# Patient Record
Sex: MALE | Race: OTHER | HISPANIC OR LATINO | Employment: FULL TIME | URBAN - METROPOLITAN AREA
[De-identification: names, ages, dates, MRNs, and addresses within clinical notes are randomized per-mention and may not be internally consistent; named-entity substitution may affect disease eponyms.]

---

## 2018-02-16 ENCOUNTER — APPOINTMENT (EMERGENCY)
Dept: CT IMAGING | Facility: HOSPITAL | Age: 38
DRG: 419 | End: 2018-02-16
Payer: COMMERCIAL

## 2018-02-16 ENCOUNTER — APPOINTMENT (EMERGENCY)
Dept: ULTRASOUND IMAGING | Facility: HOSPITAL | Age: 38
DRG: 419 | End: 2018-02-16
Payer: COMMERCIAL

## 2018-02-16 ENCOUNTER — HOSPITAL ENCOUNTER (INPATIENT)
Facility: HOSPITAL | Age: 38
LOS: 1 days | Discharge: HOME | DRG: 419 | End: 2018-02-18
Attending: EMERGENCY MEDICINE | Admitting: SURGERY
Payer: COMMERCIAL

## 2018-02-16 DIAGNOSIS — K81.0 ACUTE CHOLECYSTITIS: Primary | ICD-10-CM

## 2018-02-16 DIAGNOSIS — K81.9 CHOLECYSTITIS: ICD-10-CM

## 2018-02-16 LAB
ALBUMIN SERPL BCP-MCNC: 3.9 G/DL (ref 3.5–5)
ALP SERPL-CCNC: 67 U/L (ref 46–116)
ALT SERPL W P-5'-P-CCNC: 52 U/L (ref 12–78)
ANION GAP SERPL CALCULATED.3IONS-SCNC: 8 MMOL/L (ref 4–13)
AST SERPL W P-5'-P-CCNC: 24 U/L (ref 5–45)
BASOPHILS # BLD AUTO: 0.02 THOUSANDS/ΜL (ref 0–0.1)
BASOPHILS NFR BLD AUTO: 0 % (ref 0–1)
BILIRUB SERPL-MCNC: 0.3 MG/DL (ref 0.2–1)
BUN SERPL-MCNC: 17 MG/DL (ref 5–25)
CALCIUM SERPL-MCNC: 8.9 MG/DL (ref 8.3–10.1)
CHLORIDE SERPL-SCNC: 106 MMOL/L (ref 100–108)
CO2 SERPL-SCNC: 27 MMOL/L (ref 21–32)
CREAT SERPL-MCNC: 0.82 MG/DL (ref 0.6–1.3)
EOSINOPHIL # BLD AUTO: 0.04 THOUSAND/ΜL (ref 0–0.61)
EOSINOPHIL NFR BLD AUTO: 1 % (ref 0–6)
ERYTHROCYTE [DISTWIDTH] IN BLOOD BY AUTOMATED COUNT: 12 % (ref 11.6–15.1)
GFR SERPL CREATININE-BSD FRML MDRD: 113 ML/MIN/1.73SQ M
GLUCOSE SERPL-MCNC: 102 MG/DL (ref 65–140)
HCT VFR BLD AUTO: 40 % (ref 36.5–49.3)
HGB BLD-MCNC: 13.9 G/DL (ref 12–17)
LACTATE SERPL-SCNC: 0.4 MMOL/L (ref 0.5–2)
LIPASE SERPL-CCNC: 71 U/L (ref 73–393)
LYMPHOCYTES # BLD AUTO: 1.93 THOUSANDS/ΜL (ref 0.6–4.47)
LYMPHOCYTES NFR BLD AUTO: 22 % (ref 14–44)
MCH RBC QN AUTO: 31 PG (ref 26.8–34.3)
MCHC RBC AUTO-ENTMCNC: 34.8 G/DL (ref 31.4–37.4)
MCV RBC AUTO: 89 FL (ref 82–98)
MONOCYTES # BLD AUTO: 0.73 THOUSAND/ΜL (ref 0.17–1.22)
MONOCYTES NFR BLD AUTO: 8 % (ref 4–12)
NEUTROPHILS # BLD AUTO: 6.14 THOUSANDS/ΜL (ref 1.85–7.62)
NEUTS SEG NFR BLD AUTO: 69 % (ref 43–75)
PLATELET # BLD AUTO: 270 THOUSANDS/UL (ref 149–390)
PMV BLD AUTO: 9.4 FL (ref 8.9–12.7)
POTASSIUM SERPL-SCNC: 3.9 MMOL/L (ref 3.5–5.3)
PROT SERPL-MCNC: 7.1 G/DL (ref 6.4–8.2)
RBC # BLD AUTO: 4.49 MILLION/UL (ref 3.88–5.62)
SODIUM SERPL-SCNC: 141 MMOL/L (ref 136–145)
WBC # BLD AUTO: 8.86 THOUSAND/UL (ref 4.31–10.16)

## 2018-02-16 PROCEDURE — 99285 EMERGENCY DEPT VISIT HI MDM: CPT

## 2018-02-16 PROCEDURE — 96361 HYDRATE IV INFUSION ADD-ON: CPT

## 2018-02-16 PROCEDURE — 80053 COMPREHEN METABOLIC PANEL: CPT | Performed by: EMERGENCY MEDICINE

## 2018-02-16 PROCEDURE — 85025 COMPLETE CBC W/AUTO DIFF WBC: CPT | Performed by: EMERGENCY MEDICINE

## 2018-02-16 PROCEDURE — 96375 TX/PRO/DX INJ NEW DRUG ADDON: CPT

## 2018-02-16 PROCEDURE — 76705 ECHO EXAM OF ABDOMEN: CPT

## 2018-02-16 PROCEDURE — 96365 THER/PROPH/DIAG IV INF INIT: CPT

## 2018-02-16 PROCEDURE — 99220 PR INITIAL OBSERVATION CARE/DAY 70 MINUTES: CPT | Performed by: PHYSICIAN ASSISTANT

## 2018-02-16 PROCEDURE — 83690 ASSAY OF LIPASE: CPT | Performed by: EMERGENCY MEDICINE

## 2018-02-16 PROCEDURE — 83605 ASSAY OF LACTIC ACID: CPT | Performed by: EMERGENCY MEDICINE

## 2018-02-16 PROCEDURE — 36415 COLL VENOUS BLD VENIPUNCTURE: CPT | Performed by: EMERGENCY MEDICINE

## 2018-02-16 PROCEDURE — 96367 TX/PROPH/DG ADDL SEQ IV INF: CPT

## 2018-02-16 PROCEDURE — 74177 CT ABD & PELVIS W/CONTRAST: CPT

## 2018-02-16 RX ORDER — SODIUM CHLORIDE, SODIUM LACTATE, POTASSIUM CHLORIDE, CALCIUM CHLORIDE 600; 310; 30; 20 MG/100ML; MG/100ML; MG/100ML; MG/100ML
84 INJECTION, SOLUTION INTRAVENOUS CONTINUOUS
Status: DISCONTINUED | OUTPATIENT
Start: 2018-02-16 | End: 2018-02-18 | Stop reason: HOSPADM

## 2018-02-16 RX ORDER — ONDANSETRON 2 MG/ML
4 INJECTION INTRAMUSCULAR; INTRAVENOUS EVERY 6 HOURS PRN
Status: DISCONTINUED | OUTPATIENT
Start: 2018-02-16 | End: 2018-02-18 | Stop reason: HOSPADM

## 2018-02-16 RX ORDER — KETOROLAC TROMETHAMINE 30 MG/ML
15 INJECTION, SOLUTION INTRAMUSCULAR; INTRAVENOUS ONCE
Status: COMPLETED | OUTPATIENT
Start: 2018-02-16 | End: 2018-02-16

## 2018-02-16 RX ORDER — ONDANSETRON 2 MG/ML
4 INJECTION INTRAMUSCULAR; INTRAVENOUS ONCE
Status: COMPLETED | OUTPATIENT
Start: 2018-02-16 | End: 2018-02-16

## 2018-02-16 RX ADMIN — METRONIDAZOLE 500 MG: 500 INJECTION, SOLUTION INTRAVENOUS at 21:59

## 2018-02-16 RX ADMIN — IOHEXOL 100 ML: 350 INJECTION, SOLUTION INTRAVENOUS at 20:04

## 2018-02-16 RX ADMIN — SODIUM CHLORIDE 1000 ML: 0.9 INJECTION, SOLUTION INTRAVENOUS at 19:06

## 2018-02-16 RX ADMIN — CEFAZOLIN SODIUM 1000 MG: 1 SOLUTION INTRAVENOUS at 21:00

## 2018-02-16 RX ADMIN — ONDANSETRON 4 MG: 2 INJECTION INTRAMUSCULAR; INTRAVENOUS at 19:10

## 2018-02-16 RX ADMIN — KETOROLAC TROMETHAMINE 15 MG: 30 INJECTION, SOLUTION INTRAMUSCULAR at 19:11

## 2018-02-16 NOTE — ED PROVIDER NOTES
History  Chief Complaint   Patient presents with    Abdominal Pain     pt reports having nausea and abdominal pain x 3 days  no fevers  no OTc meds      70-year-old male presents to the emergency department for evaluation of abdominal pain with nausea and diarrhea  Patient's symptoms started 3 days ago  At that time he had generalized abdominal cramping  The pain was coming go in waves  At times the pain radiates into his back  Yesterday he had multiple episodes of loose watery nonbloody stool  He had nausea and dry heaving  He denies fever  Patient's significant other at the bedside did have some abdominal discomfort and chills a few days ago, this was 1 day before the patient developed symptoms  No travel  No suspicious food intake  No blood in the stool or vomit  Patient reports severely impaired appetite  No dysuria or hematuria  History provided by:  Patient   used: Yes    Abdominal Pain   Pain location:  Generalized  Pain quality: cramping and sharp    Pain radiates to:  Back  Pain severity:  Moderate  Onset quality:  Gradual  Duration:  3 days  Timing:  Intermittent  Progression:  Unchanged  Chronicity:  New  Context: sick contacts    Context: not alcohol use, not previous surgeries, not recent travel and not suspicious food intake    Relieved by:  Nothing  Worsened by:  Nothing  Ineffective treatments:  None tried  Associated symptoms: anorexia, diarrhea and nausea    Associated symptoms: no constipation, no dysuria, no fever, no hematuria and no sore throat    Risk factors: no recent hospitalization        None       History reviewed  No pertinent past medical history  Past Surgical History:   Procedure Laterality Date    CHOLECYSTECTOMY LAPAROSCOPIC N/A 2/17/2018    Procedure: CHOLECYSTECTOMY LAPAROSCOPIC;  Surgeon: Daniel Angel MD;  Location: [facility];  Service: General       History reviewed  No pertinent family history    I have reviewed and agree with the history as documented  Social History   Substance Use Topics    Smoking status: Never Smoker    Smokeless tobacco: Never Used    Alcohol use Not on file      Comment: 1 drink per week        Review of Systems   Constitutional: Negative for fever  HENT: Negative for sore throat  Gastrointestinal: Positive for abdominal pain, anorexia, diarrhea and nausea  Negative for constipation  Genitourinary: Negative for dysuria and hematuria  All other systems reviewed and are negative  Physical Exam  ED Triage Vitals [02/16/18 1617]   Temperature Pulse Respirations Blood Pressure SpO2   99 4 °F (37 4 °C) 72 16 144/72 98 %      Temp Source Heart Rate Source Patient Position - Orthostatic VS BP Location FiO2 (%)   Oral Monitor Sitting Left arm --      Pain Score       8           Orthostatic Vital Signs  Vitals:    02/17/18 1500 02/17/18 1615 02/17/18 2335 02/18/18 0836   BP: 143/83 140/80 118/59 115/57   Pulse: 79 80 71 63   Patient Position - Orthostatic VS: Standing Sitting Lying Lying       Physical Exam   Constitutional: He is oriented to person, place, and time  Vital signs are normal  He appears well-developed and well-nourished  HENT:   Head: Normocephalic and atraumatic  Mouth/Throat: Mucous membranes are dry  Eyes: Conjunctivae and EOM are normal  Pupils are equal, round, and reactive to light  Neck: Normal range of motion  Neck supple  Cardiovascular: Normal rate, regular rhythm, normal heart sounds and intact distal pulses  Pulmonary/Chest: Effort normal and breath sounds normal  No accessory muscle usage  No respiratory distress  He exhibits no tenderness  Abdominal: Soft  Normal appearance and bowel sounds are normal  He exhibits no distension  There is tenderness in the right upper quadrant, periumbilical area and left upper quadrant  There is no rebound and no guarding  No hernia  Musculoskeletal: Normal range of motion  He exhibits no edema, tenderness or deformity  Lymphadenopathy:     He has no cervical adenopathy  Neurological: He is alert and oriented to person, place, and time  He has normal strength and normal reflexes  Coordination normal    Skin: Skin is warm, dry and intact  No rash noted  Psychiatric: He has a normal mood and affect  His behavior is normal  Judgment and thought content normal    Vitals reviewed  ED Medications  Medications   sodium chloride 0 9 % bolus 1,000 mL (0 mL Intravenous Stopped 2/16/18 2005)   ondansetron (ZOFRAN) injection 4 mg (4 mg Intravenous Given 2/16/18 1910)   ketorolac (TORADOL) injection 15 mg (15 mg Intravenous Given 2/16/18 1911)   iohexol (OMNIPAQUE) 350 MG/ML injection (MULTI-DOSE) 100 mL (100 mL Intravenous Given 2/16/18 2004)   metroNIDAZOLE (FLAGYL) IVPB (premix) 500 mg (0 mg Intravenous Stopped 2/16/18 2244)   ceFAZolin (ANCEF) IVPB (premix) 1,000 mg (0 mg Intravenous Stopped 2/16/18 2139)       Diagnostic Studies  Results Reviewed     Procedure Component Value Units Date/Time    Lactic acid, plasma [[id number]]  (Abnormal) Collected:  02/16/18 1906    Lab Status:  Final result Specimen:  Blood from Arm, Right Updated:  02/16/18 1940     LACTIC ACID 0 4 (L) mmol/L     Narrative:         Result may be elevated if tourniquet was used during collection      Comprehensive metabolic panel [[id number]] Collected:  02/16/18 1906    Lab Status:  Final result Specimen:  Blood from Arm, Right Updated:  02/16/18 1938     Sodium 141 mmol/L      Potassium 3 9 mmol/L      Chloride 106 mmol/L      CO2 27 mmol/L      Anion Gap 8 mmol/L      BUN 17 mg/dL      Creatinine 0 82 mg/dL      Glucose 102 mg/dL      Calcium 8 9 mg/dL      AST 24 U/L      ALT 52 U/L      Alkaline Phosphatase 67 U/L      Total Protein 7 1 g/dL      Albumin 3 9 g/dL      Total Bilirubin 0 30 mg/dL      eGFR 113 ml/min/1 73sq m     Narrative:         National Kidney Disease Education Program recommendations are as follows:  GFR calculation is accurate only with a steady state creatinine  Chronic Kidney disease less than 60 ml/min/1 73 sq  meters  Kidney failure less than 15 ml/min/1 73 sq  meters  Lipase [[id number]]  (Abnormal) Collected:  02/16/18 1906    Lab Status:  Final result Specimen:  Blood from Arm, Right Updated:  02/16/18 1938     Lipase 71 (L) u/L     CBC and differential [[id number]]  (Normal) Collected:  02/16/18 1906    Lab Status:  Final result Specimen:  Blood from Arm, Right Updated:  02/16/18 1916     WBC 8 86 Thousand/uL      RBC 4 49 Million/uL      Hemoglobin 13 9 g/dL      Hematocrit 40 0 %      MCV 89 fL      MCH 31 0 pg      MCHC 34 8 g/dL      RDW 12 0 %      MPV 9 4 fL      Platelets 489 Thousands/uL      Neutrophils Relative 69 %      Lymphocytes Relative 22 %      Monocytes Relative 8 %      Eosinophils Relative 1 %      Basophils Relative 0 %      Neutrophils Absolute 6 14 Thousands/µL      Lymphocytes Absolute 1 93 Thousands/µL      Monocytes Absolute 0 73 Thousand/µL      Eosinophils Absolute 0 04 Thousand/µL      Basophils Absolute 0 02 Thousands/µL                  US gallbladder   Final Result by Tomeka Christopher MD (02/16 2215)      Cholelithiasis with gallbladder sludge and gallbladder wall thickening with trace amount of pericholecystic fluid concerning for acute cholecystitis  Workstation performed: [id number]         CT abdomen pelvis with contrast   Final Result by Tomeka Christopher MD (02/16 2033)      Suspect acute cholecystitis  Correlate with right upper quadrant ultrasound              Workstation performed: [id number]                    Procedures  Procedures       Phone Contacts  ED Phone Contact    ED Course  ED Course as of Feb 19 1015   Fri Feb 16, 2018   2040 Case discussed with on-call surgical resident who will follow up with the patient after ultrasound is performed                                MDM  Number of Diagnoses or Management Options  Acute cholecystitis: new and requires workup     Amount and/or Complexity of Data Reviewed  Clinical lab tests: ordered and reviewed  Tests in the radiology section of CPT®: ordered and reviewed  Decide to obtain previous medical records or to obtain history from someone other than the patient: yes  Obtain history from someone other than the patient: yes  Discuss the patient with other providers: yes  Independent visualization of images, tracings, or specimens: yes    Patient Progress  Patient progress: stable    CritCare Time    Disposition  Final diagnoses:   Acute cholecystitis     Time reflects when diagnosis was documented in both MDM as applicable and the Disposition within this note     Time User Action Codes Description Comment    2/16/2018  8:39 PM Marilin Hickey Add [K81 0] Acute cholecystitis     2/17/2018  9:24 AM Ami Falcon Modify [K81 0] Acute cholecystitis     2/17/2018 10:28 AM Janette Henry Modify [K81 0] Acute cholecystitis     2/18/2018  8:02 AM Janette Henry Add [K81 9] Cholecystitis       ED Disposition     ED Disposition Condition Comment    Admit  Case was discussed with Surg Resident Janette Stapleton and the patient's admission status was agreed to be Admission Status: observation status to the service of Dr Fela Barnard           Follow-up Information     Follow up With Specialties Details Why Cesar Crandall MD General Surgery Follow up in 2 week(s)  [address]          Discharge Medication List as of 2/18/2018  9:09 AM      START taking these medications    Details   oxyCODONE-acetaminophen (PERCOCET) 5-325 mg per tablet Take 1 tablet by mouth every 4 (four) hours as needed for moderate pain for up to 10 days Max Daily Amount: 6 tablets, Starting Sun 2/18/2018, Until Wed 2/28/2018, Print             Outpatient Discharge Orders  Discharge patient         ED Provider  Electronically Signed by           Wan Ayala DO  02/19/18 7884

## 2018-02-17 ENCOUNTER — ANESTHESIA EVENT (OUTPATIENT)
Dept: PERIOP | Facility: HOSPITAL | Age: 38
DRG: 419 | End: 2018-02-17
Payer: COMMERCIAL

## 2018-02-17 ENCOUNTER — ANESTHESIA (OUTPATIENT)
Dept: PERIOP | Facility: HOSPITAL | Age: 38
DRG: 419 | End: 2018-02-17
Payer: COMMERCIAL

## 2018-02-17 PROBLEM — K81.0 ACUTE CHOLECYSTITIS: Status: ACTIVE | Noted: 2018-02-16

## 2018-02-17 PROCEDURE — 0FT44ZZ RESECTION OF GALLBLADDER, PERCUTANEOUS ENDOSCOPIC APPROACH: ICD-10-PCS | Performed by: SURGERY

## 2018-02-17 PROCEDURE — 88304 TISSUE EXAM BY PATHOLOGIST: CPT | Performed by: SURGERY

## 2018-02-17 PROCEDURE — 88304 TISSUE EXAM BY PATHOLOGIST: CPT | Performed by: PATHOLOGY

## 2018-02-17 RX ORDER — ROCURONIUM BROMIDE 10 MG/ML
INJECTION, SOLUTION INTRAVENOUS AS NEEDED
Status: DISCONTINUED | OUTPATIENT
Start: 2018-02-17 | End: 2018-02-17 | Stop reason: SURG

## 2018-02-17 RX ORDER — MIDAZOLAM HYDROCHLORIDE 1 MG/ML
INJECTION INTRAMUSCULAR; INTRAVENOUS AS NEEDED
Status: DISCONTINUED | OUTPATIENT
Start: 2018-02-17 | End: 2018-02-17 | Stop reason: SURG

## 2018-02-17 RX ORDER — SODIUM CHLORIDE 9 MG/ML
INJECTION, SOLUTION INTRAVENOUS AS NEEDED
Status: DISCONTINUED | OUTPATIENT
Start: 2018-02-17 | End: 2018-02-17 | Stop reason: HOSPADM

## 2018-02-17 RX ORDER — CEFAZOLIN SODIUM 1 G/3ML
INJECTION, POWDER, FOR SOLUTION INTRAMUSCULAR; INTRAVENOUS AS NEEDED
Status: DISCONTINUED | OUTPATIENT
Start: 2018-02-17 | End: 2018-02-17 | Stop reason: SURG

## 2018-02-17 RX ORDER — FENTANYL CITRATE 50 UG/ML
INJECTION, SOLUTION INTRAMUSCULAR; INTRAVENOUS AS NEEDED
Status: DISCONTINUED | OUTPATIENT
Start: 2018-02-17 | End: 2018-02-17 | Stop reason: SURG

## 2018-02-17 RX ORDER — PROPOFOL 10 MG/ML
INJECTION, EMULSION INTRAVENOUS AS NEEDED
Status: DISCONTINUED | OUTPATIENT
Start: 2018-02-17 | End: 2018-02-17 | Stop reason: SURG

## 2018-02-17 RX ORDER — KETOROLAC TROMETHAMINE 30 MG/ML
INJECTION, SOLUTION INTRAMUSCULAR; INTRAVENOUS AS NEEDED
Status: DISCONTINUED | OUTPATIENT
Start: 2018-02-17 | End: 2018-02-17 | Stop reason: SURG

## 2018-02-17 RX ORDER — OXYCODONE HYDROCHLORIDE AND ACETAMINOPHEN 5; 325 MG/1; MG/1
1 TABLET ORAL EVERY 4 HOURS PRN
Status: DISCONTINUED | OUTPATIENT
Start: 2018-02-17 | End: 2018-02-18 | Stop reason: HOSPADM

## 2018-02-17 RX ORDER — METOCLOPRAMIDE HYDROCHLORIDE 5 MG/ML
10 INJECTION INTRAMUSCULAR; INTRAVENOUS ONCE AS NEEDED
Status: DISCONTINUED | OUTPATIENT
Start: 2018-02-17 | End: 2018-02-17 | Stop reason: HOSPADM

## 2018-02-17 RX ORDER — GLYCOPYRROLATE 0.2 MG/ML
INJECTION INTRAMUSCULAR; INTRAVENOUS AS NEEDED
Status: DISCONTINUED | OUTPATIENT
Start: 2018-02-17 | End: 2018-02-17 | Stop reason: SURG

## 2018-02-17 RX ORDER — PROMETHAZINE HYDROCHLORIDE 25 MG/ML
12.5 INJECTION, SOLUTION INTRAMUSCULAR; INTRAVENOUS ONCE AS NEEDED
Status: DISCONTINUED | OUTPATIENT
Start: 2018-02-17 | End: 2018-02-17 | Stop reason: HOSPADM

## 2018-02-17 RX ORDER — OXYCODONE HYDROCHLORIDE AND ACETAMINOPHEN 5; 325 MG/1; MG/1
2 TABLET ORAL EVERY 4 HOURS PRN
Status: DISCONTINUED | OUTPATIENT
Start: 2018-02-17 | End: 2018-02-18 | Stop reason: HOSPADM

## 2018-02-17 RX ORDER — HYDROMORPHONE HYDROCHLORIDE 2 MG/ML
INJECTION, SOLUTION INTRAMUSCULAR; INTRAVENOUS; SUBCUTANEOUS AS NEEDED
Status: DISCONTINUED | OUTPATIENT
Start: 2018-02-17 | End: 2018-02-17 | Stop reason: SURG

## 2018-02-17 RX ORDER — MAGNESIUM HYDROXIDE 1200 MG/15ML
LIQUID ORAL AS NEEDED
Status: DISCONTINUED | OUTPATIENT
Start: 2018-02-17 | End: 2018-02-17 | Stop reason: HOSPADM

## 2018-02-17 RX ORDER — ONDANSETRON 2 MG/ML
INJECTION INTRAMUSCULAR; INTRAVENOUS AS NEEDED
Status: DISCONTINUED | OUTPATIENT
Start: 2018-02-17 | End: 2018-02-17 | Stop reason: SURG

## 2018-02-17 RX ORDER — ONDANSETRON 2 MG/ML
4 INJECTION INTRAMUSCULAR; INTRAVENOUS ONCE AS NEEDED
Status: DISCONTINUED | OUTPATIENT
Start: 2018-02-17 | End: 2018-02-17 | Stop reason: HOSPADM

## 2018-02-17 RX ORDER — FENTANYL CITRATE/PF 50 MCG/ML
25 SYRINGE (ML) INJECTION
Status: DISCONTINUED | OUTPATIENT
Start: 2018-02-17 | End: 2018-02-17 | Stop reason: HOSPADM

## 2018-02-17 RX ORDER — BUPIVACAINE HYDROCHLORIDE AND EPINEPHRINE 2.5; 5 MG/ML; UG/ML
INJECTION, SOLUTION EPIDURAL; INFILTRATION; INTRACAUDAL; PERINEURAL AS NEEDED
Status: DISCONTINUED | OUTPATIENT
Start: 2018-02-17 | End: 2018-02-17 | Stop reason: HOSPADM

## 2018-02-17 RX ORDER — SODIUM CHLORIDE, SODIUM LACTATE, POTASSIUM CHLORIDE, CALCIUM CHLORIDE 600; 310; 30; 20 MG/100ML; MG/100ML; MG/100ML; MG/100ML
100 INJECTION, SOLUTION INTRAVENOUS CONTINUOUS
Status: DISCONTINUED | OUTPATIENT
Start: 2018-02-17 | End: 2018-02-18 | Stop reason: HOSPADM

## 2018-02-17 RX ADMIN — SODIUM CHLORIDE, SODIUM LACTATE, POTASSIUM CHLORIDE, AND CALCIUM CHLORIDE 100 ML/HR: .6; .31; .03; .02 INJECTION, SOLUTION INTRAVENOUS at 22:20

## 2018-02-17 RX ADMIN — METRONIDAZOLE 500 MG: 500 INJECTION, SOLUTION INTRAVENOUS at 15:38

## 2018-02-17 RX ADMIN — METRONIDAZOLE 500 MG: 500 INJECTION, SOLUTION INTRAVENOUS at 22:20

## 2018-02-17 RX ADMIN — LIDOCAINE HYDROCHLORIDE 100 MG: 20 INJECTION, SOLUTION INTRAVENOUS at 08:59

## 2018-02-17 RX ADMIN — DEXAMETHASONE SODIUM PHOSPHATE 10 MG: 10 INJECTION INTRAMUSCULAR; INTRAVENOUS at 09:10

## 2018-02-17 RX ADMIN — CEFAZOLIN SODIUM 2000 MG: 1 INJECTION, POWDER, FOR SOLUTION INTRAMUSCULAR; INTRAVENOUS at 09:05

## 2018-02-17 RX ADMIN — OXYCODONE HYDROCHLORIDE AND ACETAMINOPHEN 1 TABLET: 5; 325 TABLET ORAL at 11:17

## 2018-02-17 RX ADMIN — HYDROMORPHONE HYDROCHLORIDE 0.5 MG: 2 INJECTION, SOLUTION INTRAMUSCULAR; INTRAVENOUS; SUBCUTANEOUS at 10:06

## 2018-02-17 RX ADMIN — ROCURONIUM BROMIDE 10 MG: 10 INJECTION INTRAVENOUS at 10:05

## 2018-02-17 RX ADMIN — KETOROLAC TROMETHAMINE 15 MG: 30 INJECTION, SOLUTION INTRAMUSCULAR at 10:09

## 2018-02-17 RX ADMIN — ROCURONIUM BROMIDE 30 MG: 10 INJECTION INTRAVENOUS at 08:59

## 2018-02-17 RX ADMIN — PROPOFOL 200 MG: 10 INJECTION, EMULSION INTRAVENOUS at 08:59

## 2018-02-17 RX ADMIN — METRONIDAZOLE 500 MG: 500 INJECTION, SOLUTION INTRAVENOUS at 06:09

## 2018-02-17 RX ADMIN — NEOSTIGMINE METHYLSULFATE 3 MG: 1 INJECTION, SOLUTION INTRAMUSCULAR; INTRAVENOUS; SUBCUTANEOUS at 10:18

## 2018-02-17 RX ADMIN — GLYCOPYRROLATE 0.6 MG: 0.2 INJECTION, SOLUTION INTRAMUSCULAR; INTRAVENOUS at 10:18

## 2018-02-17 RX ADMIN — ONDANSETRON 4 MG: 2 INJECTION INTRAMUSCULAR; INTRAVENOUS at 09:10

## 2018-02-17 RX ADMIN — FENTANYL CITRATE 50 MCG: 50 INJECTION INTRAMUSCULAR; INTRAVENOUS at 08:59

## 2018-02-17 RX ADMIN — CEFAZOLIN SODIUM 1000 MG: 1 SOLUTION INTRAVENOUS at 04:56

## 2018-02-17 RX ADMIN — MIDAZOLAM HYDROCHLORIDE 2 MG: 1 INJECTION, SOLUTION INTRAMUSCULAR; INTRAVENOUS at 08:57

## 2018-02-17 RX ADMIN — SODIUM CHLORIDE, SODIUM LACTATE, POTASSIUM CHLORIDE, AND CALCIUM CHLORIDE 125 ML/HR: .6; .31; .03; .02 INJECTION, SOLUTION INTRAVENOUS at 00:21

## 2018-02-17 RX ADMIN — FENTANYL CITRATE 50 MCG: 50 INJECTION INTRAMUSCULAR; INTRAVENOUS at 09:17

## 2018-02-17 NOTE — CASE MANAGEMENT
Thank you,  [address] [facility] in the [facility] by Ty Posey for 2017  Network Utilization Review Department  Phone: [phone]; Fax [phone]  ATTENTION: The Network Utilization Review Department is now centralized for our 7 Facilities  Make a note that we have a new phone and fax numbers for our Department  Please call with any questions or concerns to [phone] and carefully follow the prompts so that you are directed to the right person  All voicemails are confidential  Fax any determinations, approvals, denials, and requests for initial or continue stay review clinical to [phone]  Due to HIGH CALL volume, it would be easier if you could please send faxed requests to expedite your requests and in part, help us provide discharge notifications faster  Initial Clinical Review    Admission: Date/Time/Statement: OBSERVATION 2/16/18 @2243  CONVERTED TO INPATIENT ADMISSION 2/17/18 @1201 DUE TO INPATIENT TREATMENT FOR  ACUTE CHOLECYSTITIS NEEDED  02/17/18 1200  Inpatient Admission Once     Comments: Inpatient treatmant of acute cholecytitis needed   Transfer Service: Surgery-General       Question Answer Comment   Admitting Physician PRADEEP PETERSEN    Level of Care Med Surg    Estimated length of stay More than 2 Midnights    Certification I certify that inpatient services are medically necessary for this patient for a duration of greater than two midnights  See H&P and MD Progress Notes for additional information about the patient's course of treatment          02/17/18 1201     ED: Date/Time/Mode of Arrival:   ED Arrival Information     Expected Arrival Acuity Means of Arrival Escorted By Service Admission Type    - 2/16/2018 15:48 Urgent Walk-In Family Member Surgery-General Urgent    Arrival Complaint    abdominal pain          Chief Complaint:   Chief Complaint   Patient presents with    Abdominal Pain     pt reports having nausea and abdominal pain x 3 days  no fevers  no OTc meds        History of Illness: Maricel Haro is a 40y o  year old male who presents with 10/10 RUQ pain that radiates to the back  He's had the pain for 2-3 days  Initially it was intermittent, but today it became much worse  It is associated with anorexia, diarrhea yesterday and nausea  Denies vomiting  No previous abdominal surgeries  ED Vital Signs:   ED Triage Vitals [02/16/18 1617]   Temperature Pulse Respirations Blood Pressure SpO2   99 4 °F (37 4 °C) 72 16 144/72 98 %      Temp Source Heart Rate Source Patient Position - Orthostatic VS BP Location FiO2 (%)   Oral Monitor Sitting Left arm --      Pain Score       8        Wt Readings from Last 1 Encounters:   02/16/18 91 1 kg (200 lb 13 4 oz)       Abnormal Labs/Diagnostic Test Results:   2/16: lipase 71   Lactic acid   4    2/16 CT a&p: Suspect acute cholecystitis  Correlate with right upper quadrant ultrasound  2/16 US GB: Cholelithiasis with gallbladder sludge and gallbladder wall thickening with trace amount of pericholecystic fluid concerning for acute cholecystitis  ED Treatment:   Medication Administration from 02/16/2018 1548 to 02/16/2018 2311       Date/Time Order Dose Route Action Action by Comments     02/16/2018 1906 sodium chloride 0 9 % bolus 1,000 mL 1,000 mL Intravenous [place] Bennett RN      02/16/2018 1910 ondansetron (ZOFRAN) injection 4 mg 4 mg Intravenous Given Esha Bennett RN      02/16/2018 1911 ketorolac (TORADOL) injection 15 mg 15 mg Intravenous Given Esha Bennett RN      02/16/2018 2159 metroNIDAZOLE (FLAGYL) IVPB (premix) 500 mg 500 mg Intravenous [place] Bennett RN      02/16/2018 2100 ceFAZolin (ANCEF) IVPB (premix) 1,000 mg 1,000 mg Intravenous [place] Bennett RN           Past Medical/Surgical History:    Active Ambulatory Problems     Diagnosis Date Noted    No Active Ambulatory Problems     Resolved Ambulatory Problems     Diagnosis Date Noted    No Resolved Ambulatory Problems     No Additional Past Medical History       Admitting Diagnosis: Acute cholecystitis [K81 0]  Abdominal pain [R10 9]    Age/Sex: 40 y o  male    Assessment/Plan:   Problem List      * (Principal)Cholecystitis with sludge/stones - 39 yo health male with symptoms and radiographic findings consistent with acute cholecystitis  NPO for OR most likely tomorrow    IVF  IV abx  Pain medication               Admission Orders:  Scheduled Meds:   Current Facility-Administered Medications:  [MAR Hold] enoxaparin 40 mg Subcutaneous Daily Tim Giraldo PA-C    fentaNYL 25 mcg Intravenous Q3 min PRN Sesar Locke MD    HYDROmorphone 0 4 mg Intravenous Q5 Min PRN Sesar Locke MD    [facility] Hold] HYDROmorphone 0 5 mg Intravenous Q3H PRN Tim Giraldo PA-C    [facility] Hold] HYDROmorphone 1 mg Intravenous Q3H PRN Tim Giraldo PA-C    lactated ringers 84 mL/hr Intravenous Continuous Jere Solomon MD Last Rate: 125 mL/hr (02/17/18 0021)   metoclopramide 10 mg Intravenous Once PRN Sesar Locke MD    [facility] Hold] metroNIDAZOLE 500 mg Intravenous Q8H Tim Giraldo PA-C Last Rate: 500 mg (02/17/18 0609)   [MAR Hold] ondansetron 4 mg Intravenous Q6H PRN Tim Giraldo PA-C    ondansetron 4 mg Intravenous Once PRN Sesar Locke MD    oxyCODONE-acetaminophen 1 tablet Oral Q4H PRN Jere Solomon MD    oxyCODONE-acetaminophen 2 tablet Oral Q4H PRN Jere Solomon MD    promethazine 12 5 mg Intravenous Once PRN Sesar Locke MD      Continuous Infusions:   lactated ringers 84 mL/hr Last Rate: 125 mL/hr (02/17/18 0021)     PRN Meds: fentaNYL    HYDROmorphone    Metoclopramide    Ondansetron    oxyCODONE-acetaminophen    Promethazine    HSE DIET  Up as darleen  Encourage fluids  SCD's      PER SURGERY 2/17:  Assessment:  40 Y O M with acute cholecystitis      Plan:  NPO  IVF  OR today for lap alejo  preop and consent obtained    TAKEN TO OR 2/17 @1251:LAPAROSCOPIC CHOLECYSTECTOMY

## 2018-02-17 NOTE — ANESTHESIA POSTPROCEDURE EVALUATION
Post-Op Assessment Note      CV Status:  Stable    Mental Status:  Awake    Hydration Status:  Stable    PONV Controlled:  None    Airway Patency:  Patent    Post Op Vitals Reviewed: Yes          Staff: Anesthesiologist           /72 (02/17/18 1033)    Temp 97 7 °F (36 5 °C) (02/17/18 1033)    Pulse 85 (02/17/18 1033)   Resp 17 (02/17/18 1033)    SpO2 97 % (02/17/18 1033)

## 2018-02-17 NOTE — ANESTHESIA PREPROCEDURE EVALUATION
Review of Systems/Medical History      No history of anesthetic complications     Cardiovascular  Negative cardio ROS    Pulmonary  Negative pulmonary ROS   Comment: nonsmoker     GI/Hepatic    GERD well controlled,   Comment: etoh occas     Negative  ROS        Endo/Other  Negative endo/other ROS      GYN       Hematology  Negative hematology ROS      Musculoskeletal  Negative musculoskeletal ROS        Neurology  Negative neurology ROS      Psychology   Anxiety,              Physical Exam    Airway    Mallampati score: II  TM Distance: >3 FB  Neck ROM: full     Dental   No notable dental hx     Cardiovascular  Comment: Negative ROS,     Pulmonary      Other Findings        Anesthesia Plan  ASA Score- 1     Anesthesia Type- general with ASA Monitors  Additional Monitors:   Airway Plan: ETT  Plan Factors-    Induction- intravenous  Postoperative Plan-     Informed Consent- Anesthetic plan and risks discussed with patient

## 2018-02-17 NOTE — PROGRESS NOTES
Progress Note - General Surgery   Benji Zabala 40 y o  male MRN: [id number]  Unit/Bed#: OR POOL Encounter: [id number]    Assessment:  40 Y O M with acute cholecystitis  Plan:  NPO  IVF  OR today for lap alejo  preop and consent obtained      Subjective/Objective       Subjective: No issues overnight, pain subsiding  Blood pressure 116/55, pulse 57, temperature 97 9 °F (36 6 °C), temperature source Oral, resp  rate 18, height 5' 8" (1 727 m), weight 91 1 kg (200 lb 13 4 oz), SpO2 100 %  ,Body mass index is 30 54 kg/m²  Intake/Output Summary (Last 24 hours) at 02/17/18 0847  Last data filed at 02/16/18 2244   Gross per 24 hour   Intake             1100 ml   Output                0 ml   Net             1100 ml       Invasive Devices     Peripheral Intravenous Line            Peripheral IV 02/16/18 Right Antecubital less than 1 day                Physical Exam:   Gen: AOAx3  Lungs: CTA B/L  Abd: soft, tender in RUQ  No peritoneal signs  Lab, Imaging and other studies:  I have personally reviewed pertinent lab results    , CBC:   Lab Results   Component Value Date    WBC 8 86 02/16/2018    HGB 13 9 02/16/2018    HCT 40 0 02/16/2018    MCV 89 02/16/2018     02/16/2018    MCH 31 0 02/16/2018    MCHC 34 8 02/16/2018    RDW 12 0 02/16/2018    MPV 9 4 02/16/2018   , CMP:   Lab Results   Component Value Date     02/16/2018    K 3 9 02/16/2018     02/16/2018    CO2 27 02/16/2018    ANIONGAP 8 02/16/2018    BUN 17 02/16/2018    CREATININE 0 82 02/16/2018    GLUCOSE 102 02/16/2018    CALCIUM 8 9 02/16/2018    AST 24 02/16/2018    ALT 52 02/16/2018    ALKPHOS 67 02/16/2018    PROT 7 1 02/16/2018    BILITOT 0 30 02/16/2018    EGFR 113 02/16/2018   , Coagulation: No results found for: PT, INR, APTT, Urinalysis: No results found for: COLORU, CLARITYU, SPECGRAV, PHUR, LEUKOCYTESUR, NITRITE, PROTEINUA, GLUCOSEU, KETONESU, BILIRUBINUR, BLOODU, Amylase: No results found for: AMYLASE, Lipase:   Lab Results   Component Value Date    LIPASE 71 (L) 02/16/2018     VTE Pharmacologic Prophylaxis: Enoxaparin (Lovenox)  VTE Mechanical Prophylaxis: sequential compression device

## 2018-02-17 NOTE — H&P
History and Physical -General Surgery  Winter Zabala 40 y o  male MRN: [id number]  Unit/Bed#: -01 Encounter: [id number]        Reason for Consult / Principal Problem: Cholecystitis    HPI: Forrest Talbot is a 40y o  year old male who presents with 10/10 RUQ pain that radiates to the back  He's had the pain for 2-3 days  Initially it was intermittent, but today it became much worse  It is associated with anorexia, diarrhea yesterday and nausea  Denies vomiting  No previous abdominal surgeries  Review of Systems   Constitutional: Positive for appetite change  Negative for fever  Gastrointestinal: Positive for abdominal pain, diarrhea, nausea and vomiting  Negative for abdominal distention, blood in stool and constipation  Genitourinary: Negative for dysuria  Skin: Negative for color change  Historical Information   History reviewed  No pertinent past medical history  History reviewed  No pertinent surgical history  Social History   History   Alcohol use Not on file     Comment: 1 drink per week     History   Drug Use No     History   Smoking Status    Never Smoker   Smokeless Tobacco    Never Used     History reviewed  No pertinent family history  Meds/Allergies      Home meds: none    No prescriptions prior to admission  No current facility-administered medications for this encounter  No Known Allergies    Objective     Blood pressure 129/74, pulse 70, temperature 98 5 °F (36 9 °C), temperature source Oral, resp  rate 16, height 5' 8" (1 727 m), weight 89 8 kg (198 lb), SpO2 98 %      Intake/Output Summary (Last 24 hours) at 02/16/18 2311  Last data filed at 02/16/18 2244   Gross per 24 hour   Intake             1100 ml   Output                0 ml   Net             1100 ml       PHYSICAL EXAM  General appearance: alert and oriented, in no acute distress  Skin: Skin color, texture, turgor normal  No rashes or lesions  Head: Normocephalic, without obvious abnormality  Heart: regular rate and rhythm, S1, S2 normal, no murmur, click, rub or gallop  Lungs: clear to auscultation bilaterally  Abdomen: flat and soft, +BS, tender RUQ, no rebound or guarding  Back: negative  Rectal: deferred  Neurological: normal without focal findings    Lab Results:   Admission on 02/16/2018   Component Date Value    WBC 02/16/2018 8 86     RBC 02/16/2018 4 49     Hemoglobin 02/16/2018 13 9     Hematocrit 02/16/2018 40 0     MCV 02/16/2018 89     MCH 02/16/2018 31 0     MCHC 02/16/2018 34 8     RDW 02/16/2018 12 0     MPV 02/16/2018 9 4     Platelets 83/24/6975 270     Neutrophils Relative 02/16/2018 69     Lymphocytes Relative 02/16/2018 22     Monocytes Relative 02/16/2018 8     Eosinophils Relative 02/16/2018 1     Basophils Relative 02/16/2018 0     Neutrophils Absolute 02/16/2018 6 14     Lymphocytes Absolute 02/16/2018 1 93     Monocytes Absolute 02/16/2018 0 73     Eosinophils Absolute 02/16/2018 0 04     Basophils Absolute 02/16/2018 0 02     Sodium 02/16/2018 141     Potassium 02/16/2018 3 9     Chloride 02/16/2018 106     CO2 02/16/2018 27     Anion Gap 02/16/2018 8     BUN 02/16/2018 17     Creatinine 02/16/2018 0 82     Glucose 02/16/2018 102     Calcium 02/16/2018 8 9     AST 02/16/2018 24     ALT 02/16/2018 52     Alkaline Phosphatase 02/16/2018 67     Total Protein 02/16/2018 7 1     Albumin 02/16/2018 3 9     Total Bilirubin 02/16/2018 0 30     eGFR 02/16/2018 113     Lipase 02/16/2018 71*    LACTIC ACID 02/16/2018 0 4*     Imaging Studies: I have personally reviewed pertinent reports  ASSESSMENT/PLAN:  Problem List     * (Principal)Cholecystitis with sludge/stones - 41 yo health male with symptoms and radiographic findings consistent with acute cholecystitis  NPO for OR most likely tomorrow  IVF  IV abx  Pain medication          This patient will require < 2 night hospital stay      Counseling / Coordination of Care  Total time spent today  20 minutes  Greater than 50% of total time was spent with the patient and / or family counseling and / or coordination of care

## 2018-02-17 NOTE — OP NOTE
OPERATIVE REPORT  PATIENT NAME: Enrique Zabala    :  1980  MRN: [id number]  Pt Location: AN OR ROOM 01    SURGERY DATE: 2018    Surgeon(s) and Role:     * Miranda Walsh MD - Primary     * Eloisa Alejandre MD - Assisting    Preop Diagnosis:  Acute cholecystitis [K81 0]    Post-Op Diagnosis Codes:     * Acute cholecystitis [K81 0]    Procedure(s) (LRB):  CHOLECYSTECTOMY LAPAROSCOPIC (N/A)    Specimen(s):  ID Type Source Tests Collected by Time Destination   1 : gallbladder Tissue Gallbladder TISSUE EXAM Miranda Walsh MD 2018 [id number]        Estimated Blood Loss:   Minimal    Drains:       Anesthesia Type:   General    Operative Indications:  Acute cholecystitis [K81 0]      Operative Findings:  Independent, nonsmoker, ASA 2, wound class 2, BMI 28, weight 200 height 64 inches  No history of anesthetic complications     Cardiovascular  Negative cardio ROS  Pulmonary  Negative pulmonary ROS   Comment: nonsmoker      GI/Hepatic  GERD well controlled,   Comment: etoh occas      Negative  ROS       Endo/Other  Negative endo/other ROS     GYN      Hematology  Negative hematology ROS     Musculoskeletal  Negative musculoskeletal ROS       Neurology  Negative neurology ROS     Psychology   Anxiety,                   Complications:   None    Procedure and Technique:  Patient was identified visually and via armband  Placed in the supine position  After general anesthesia the abdomen was prepped and draped in a sterile fashion  0 25% Marcaine with epinephrine was utilized throughout the procedure  An incision was made and carried down through skin subcutaneous tissue  Under direct vision a 10 mm trocar was placed  This was followed by CO2 insufflation  Three additional trocars were placed in the upper aspect of the abdomen under direct vision  Gallbladder was thick-walled and distended  Edema present was notable  Acute cholecystitis was present  This was decompressed      A dome down technique was then utilized with electrocautery  This was carried down over Edouard's pouch with electrocautery  The cystic duct and artery were then dissected free revealing the critical view  Structures were clipped x3 and then divided  Cystic artery was clipped x2 and divided  The gallbladder was then removed from liver bed  The areas irrigated  It was aspirated dry  Hemostasis was assured  The gallbladder was removed through the umbilical site after placement in Endo-Catch bag  Fascia was closed with 0 Vicryl suture followed by 4 Monocryl suture and Dermabond  The patient tolerated the procedure well  Sponges count correct       I was present for the entire procedure    Patient Disposition:  PACU     SIGNATURE: Yvette Hawkins MD  DATE: February 17, 2018  TIME: 12:51 PM

## 2018-02-18 VITALS
BODY MASS INDEX: 30.44 KG/M2 | DIASTOLIC BLOOD PRESSURE: 57 MMHG | WEIGHT: 200.84 LBS | SYSTOLIC BLOOD PRESSURE: 115 MMHG | RESPIRATION RATE: 18 BRPM | HEART RATE: 63 BPM | OXYGEN SATURATION: 99 % | TEMPERATURE: 98.9 F | HEIGHT: 68 IN

## 2018-02-18 RX ORDER — OXYCODONE HYDROCHLORIDE AND ACETAMINOPHEN 5; 325 MG/1; MG/1
1 TABLET ORAL EVERY 4 HOURS PRN
Qty: 20 TABLET | Refills: 0 | Status: SHIPPED | OUTPATIENT
Start: 2018-02-18 | End: 2018-02-28

## 2018-02-18 RX ADMIN — OXYCODONE HYDROCHLORIDE AND ACETAMINOPHEN 1 TABLET: 5; 325 TABLET ORAL at 12:52

## 2018-02-18 RX ADMIN — METRONIDAZOLE 500 MG: 500 INJECTION, SOLUTION INTRAVENOUS at 05:05

## 2018-02-18 NOTE — PROGRESS NOTES
Progress Note - General Surgery   Keerthi Zabala 40 y o  male MRN: [id number]  Unit/Bed#: -01 Encounter: [id number]    Assessment:  40 Y O M with acute cholecystitis  Plan:  House diet  Pain control  D/C IVF  DC today       Subjective/Objective       Subjective: No issues overnight, pain subsiding  Tolerating house diet  No bowel function  Blood pressure 118/59, pulse 71, temperature 98 7 °F (37 1 °C), temperature source Oral, resp  rate 18, height 5' 8" (1 727 m), weight 91 1 kg (200 lb 13 4 oz), SpO2 97 %  ,Body mass index is 30 54 kg/m²  Intake/Output Summary (Last 24 hours) at 02/18/18 0751  Last data filed at 02/17/18 2220   Gross per 24 hour   Intake          2914 16 ml   Output                0 ml   Net          2914 16 ml       Invasive Devices     Peripheral Intravenous Line            Peripheral IV 02/18/18 Left Antecubital less than 1 day                Physical Exam:   Gen: AOAx3  Lungs: CTA B/L  Abd: soft, tender in RUQ  No peritoneal signs  Lab, Imaging and other studies:  I have personally reviewed pertinent lab results    , CBC:   No results found for: WBC, HGB, HCT, MCV, PLT, ADJUSTEDWBC, MCH, MCHC, RDW, MPV, NRBC, CMP:   No results found for: NA, K, CL, CO2, ANIONGAP, BUN, CREATININE, GLUCOSE, CALCIUM, AST, ALT, ALKPHOS, PROT, ALBUMIN, BILITOT, EGFR, Coagulation: No results found for: PT, INR, APTT, Urinalysis: No results found for: COLORU, CLARITYU, SPECGRAV, PHUR, LEUKOCYTESUR, NITRITE, PROTEINUA, GLUCOSEU, KETONESU, BILIRUBINUR, BLOODU, Amylase: No results found for: AMYLASE, Lipase:   No results found for: LIPASE  VTE Pharmacologic Prophylaxis: Enoxaparin (Lovenox)  VTE Mechanical Prophylaxis: sequential compression device

## 2018-02-18 NOTE — DISCHARGE SUMMARY
Discharge Summary - Elvis Zabala 40 y o  male MRN: [id number]    Unit/Bed#: -01 Encounter: [id number]    Admission Date: 2/16/2018     Admitting Diagnosis: Acute cholecystitis [K81 0]  Abdominal pain [R10 9]    HPI: 40year old male presented to the emergency room with abdominal pain for less than 24 hours in the right upper quadrant  Cat scan of the abdomen and pelvis performed which showed acute cholecystitis  General surgery was then consulted  Procedures Performed: Laparoscopic cholecystectomy 2/17/2018  Dr BREWER [facility] Course: The patient was admitted under general surgery service and on the following day, he underwent a laparoscopic cholecystectomy  Postoperatively, he was doing well, placed on a regular diet  At the time of his discharge, he was ambulating, tolerating a house diet and in no acute distress  He will follow up in the office in 2 weeks  Significant Findings, Care, Treatment and Services Provided:  1 Acute cholecystitis on cat scan 2/16/2018    2 laparoscopic cholecystectomy  Dr Lindsay Etienne 4/51/2366    Complications: None     Discharge Diagnosis: Acute cholecystitis    Resolved Problems  Date Reviewed: 2/16/2018    None          Condition at Discharge: good     Discharge instructions/Information to patient and family:   See after visit summary for information provided to patient and family  Provisions for Follow-Up Care:  See after visit summary for information related to follow-up care and any pertinent home health orders  Disposition: Home    Planned Readmission: No    Discharge Statement   I spent 15 minutes discharging the patient  This time was spent on the day of discharge  I had direct contact with the patient on the day of discharge  Additional documentation is required if more than 30 minutes were spent on discharge  Discharge Medications:  See after visit summary for reconciled discharge medications provided to patient and family

## 2018-02-18 NOTE — DISCHARGE INSTRUCTIONS
Your incision is closed with histocryl  These will fall off by themselves  Take pain medications as needed for pain   Please call to schedule a follow up appointment in 2 weeks in the office  Ok to shower  Ok to resume regular diet  No heavy anything greater than 10 lbs for 4 weeks  Please call or return to the emergency department with any increased abdominal pain, nausea, vomiting, shortness of breath or fevers greater than 101F  Colecistectomía laparoscópica   LO QUE NECESITA SABER:   La colecistectomía laparoscópica es bennie cirugía para remover guzman vesícula biliar  INSTRUCCIONES SOBRE EL [facility]IR HOSPITALARIA:   Medicamentos:  Es posible que usted necesite alguno de los siguientes:  · Un medicamento con receta para el dolor  ayudan a calmar el dolor  No espere hasta que el dolor sea severo antes de diana shanna medicamento  · AINEs (Analgésicos antiinflamatorios no esteroides)  disminuyen la inflamación y el dolor  Shanna medicamento se puede comprar con o sin receta médica  Shanna medicamento puede causar sangrado estomacal o problemas en los riñones en ciertas personas  Si usted huseyin un medicamento anticoagulante, siempre pregúntele a guzman médico si los ESTER son seguros para usted  Vesna las etiquetas de los medicamentos y siga las indicaciones antes de usarlos  · [place] azael medicamentos ana se le haya indicado  Consulte con guzman médico si usted deb que guzman medicamento no le está ayudando o si presenta efectos secundarios  Infórmele si es alérgico a cualquier medicamento  Mantenga bennie lista actualizada de los [place], las vitaminas y los productos herbales que huseyin  Incluya los siguientes datos de los medicamentos: cantidad, frecuencia y motivo de administración  Traiga con usted la lista o los envases de la píldoras a azael citas de seguimiento  Lleve la lista de los medicamentos con usted en gil de bennie emergencia    Programe bennie dottie con guzman médico o gastroenterólogo 2 semanas después de la [facility], o según le indiquen:  Anote michelle preguntas para que se acuerde de hacerlas david michelle visitas  Cuidado de la herida:  Cuide de michelle heridas quirúrgicas según indicaciones  [address] y secas  Usted puede bañarse el día después de guzman [place]  Qué comer después de la cirugía:  Consuma alimentos bajos en grasas por 4 a 6 semanas mientras guzman cuerpo aprende a digerir las grasas sin usar guzman vesícula  Aumente poco a poco la cantidad de grasas que consume  [place] suficiente líquidos  Pregunte cuánto líquido diana y cuáles líquidos son mejores para usted  Cuando regresar al [place] y otras actividades:  Usted puede regresar a guzman trabajo u otras actividades tan pronto ana guzman dolor esté bajo control y usted se sienta cómodo  Para muchas personas, esto es de 5 a 7 días después de la [facility]  Pregúntele a guzman [place] vitaminas y minerales son adecuados para usted  · Usted tiene fiebre de más de 101°F (38°C) o tiene escalofríos  · Usted tiene dolor o náuseas que no se alivian con medicamento  · Usted tiene enrojecimiento e inflamación alrededor de michelle incisiones, o de michelle incisiones salen nick o pus  · Usted está estreñido o tiene diarrea  · Guzman piel u ojos están amarillos, o michelle heces están de color pálido  · Usted tiene preguntas acerca de guzman cirugía, condición o cuidado  Busque atención médica de inmediato o llame al 911 si:   · Usted no puede dejar de vomitar  · Michelle heces son negras o tienen nick  · Usted tiene dolor en guzman abdomen y shanna se encuentra inflamado o darlene  · Guzman brazo o pierna se siente caliente, sensible y [place]  Se podría dejuan inflamado y silva  · Usted se siente mareada, le hace falta el aire y tiene dolor de [place]  · Usted expectora nick  © 2017 [address] Information is for End User's use only and may not be sold, redistributed or otherwise used for commercial purposes   All illustrations and images included in CareNotes® are the copyrighted property of A D A M , Inc  or [address]  Esta información es sólo para uso en educación  Guzman intención no es darle un consejo médico sobre enfermedades o tratamientos  Colsulte con guzman [employer] farmacéutico antes de seguir cualquier régimen médico para saber si es seguro y efectivo para usted

## 2018-02-19 NOTE — CASE MANAGEMENT
Reference #[id number]    Notification of Discharge  This is a Notification of Discharge from our facility [address]  Please be advised that this patient has been discharge from our facility  Below you will find the admission and discharge date and time including the patients disposition  PRESENTATION DATE: 2/16/2018  5:57 PM  IP ADMISSION DATE: 2/17/18 1201  DISCHARGE DATE: 2/18/2018  1:19 PM  DISPOSITION: Home/Self Care    [address] [place] [address] in the [facility] by Ty Posey for 2017  Network Utilization Review Department  Phone: [phone]; Fax [phone]  ATTENTION: The Network Utilization Review Department is now centralized for our 7 Facilities  Make a note that we have a new phone and fax numbers for our Department  Please call with any questions or concerns to [phone] and carefully follow the prompts so that you are directed to the right person  All voicemails are confidential  Fax any determinations, approvals, denials, and requests for initial or continue stay review clinical to [phone]  Due to HIGH CALL volume, it would be easier if you could please send faxed requests to expedite your requests and in part, help us provide discharge notifications faster

## (undated) DEVICE — TROCAR APPPLE 5MM EXTENDED LENGTH

## (undated) DEVICE — VIAL DECANTER

## (undated) DEVICE — GLOVE SRG BIOGEL ECLIPSE 7

## (undated) DEVICE — SUT MONOCRYL 4-0 PS-2 27 IN Y426H

## (undated) DEVICE — LIGAMAX 5 MM ENDOSCOPIC MULTIPLE CLIP APPLIER: Brand: LIGAMAX

## (undated) DEVICE — TOWEL SET X-RAY

## (undated) DEVICE — NEEDLE 22 G X 1 1/2 SAFETY

## (undated) DEVICE — STERILE SURGICAL LUBRICANT,  TUBE: Brand: SURGILUBE

## (undated) DEVICE — ELECTRODE LAP J HOOK SPLIT STEM E-Z CLEAN 33CM -0021S

## (undated) DEVICE — UNDYED BRAIDED (POLYGLACTIN 910), SYNTHETIC ABSORBABLE SUTURE: Brand: COATED VICRYL

## (undated) DEVICE — ENDOPATH XCEL BLADELESS TROCARS WITH STABILITY SLEEVES: Brand: ENDOPATH XCEL

## (undated) DEVICE — ENDOPOUCH RETRIEVER SPECIMEN RETRIEVAL BAGS: Brand: ENDOPOUCH RETRIEVER

## (undated) DEVICE — ALLENTOWN LAP CHOLE APP PACK: Brand: CARDINAL HEALTH

## (undated) DEVICE — INTENDED FOR TISSUE SEPARATION, AND OTHER PROCEDURES THAT REQUIRE A SHARP SURGICAL BLADE TO PUNCTURE OR CUT.: Brand: BARD-PARKER SAFETY BLADES SIZE 11, STERILE

## (undated) DEVICE — ADHESIVE SKN CLSR HISTOACRYL FLEX 0.5ML LF

## (undated) DEVICE — REM POLYHESIVE ADULT PATIENT RETURN ELECTRODE: Brand: VALLEYLAB

## (undated) DEVICE — IRRIG ENDO FLO TUBING

## (undated) DEVICE — SCD SEQUENTIAL COMPRESSION COMFORT SLEEVE MEDIUM KNEE LENGTH: Brand: KENDALL SCD

## (undated) DEVICE — LIGHT HANDLE COVER SLEEVE DISP BLUE STELLAR

## (undated) DEVICE — CHLORAPREP HI-LITE 26ML ORANGE